# Patient Record
Sex: FEMALE | URBAN - NONMETROPOLITAN AREA
[De-identification: names, ages, dates, MRNs, and addresses within clinical notes are randomized per-mention and may not be internally consistent; named-entity substitution may affect disease eponyms.]

---

## 2018-06-15 PROBLEM — 40739000 DYSPHAGIA: Status: ACTIVE | Noted: 2018-04-10

## 2018-06-15 PROBLEM — 31297008 SOMATOFORM DISORDER: Status: ACTIVE | Noted: 2018-04-10

## 2018-06-15 PROBLEM — 440630006 IRRITABLE BOWEL SYNDROME CHARACTERIZED BY CONSTIPATION: Status: ACTIVE | Noted: 2018-06-15

## 2018-06-15 PROBLEM — 302914006 BARRETT'S ESOPHAGUS: Status: ACTIVE | Noted: 2018-04-10

## 2023-08-21 ENCOUNTER — CLAIMS CREATED FROM THE CLAIM WINDOW (OUTPATIENT)
Dept: URBAN - NONMETROPOLITAN AREA CLINIC 4 | Facility: CLINIC | Age: 68
End: 2023-08-21
Payer: COMMERCIAL

## 2023-08-21 ENCOUNTER — DASHBOARD ENCOUNTERS (OUTPATIENT)
Age: 68
End: 2023-08-21

## 2023-08-21 VITALS
HEIGHT: 63 IN | TEMPERATURE: 97.2 F | HEART RATE: 91 BPM | SYSTOLIC BLOOD PRESSURE: 111 MMHG | WEIGHT: 181 LBS | DIASTOLIC BLOOD PRESSURE: 62 MMHG | BODY MASS INDEX: 32.07 KG/M2

## 2023-08-21 DIAGNOSIS — R11.2 NAUSEA AND VOMITING, INTRACTABILITY OF VOMITING NOT SPECIFIED, UNSPECIFIED VOMITING TYPE: ICD-10-CM

## 2023-08-21 DIAGNOSIS — R11.2 ACUTE NAUSEA WITH NONBILIOUS VOMITING: ICD-10-CM

## 2023-08-21 DIAGNOSIS — R13.10 DYSPHAGIA, UNSPECIFIED: ICD-10-CM

## 2023-08-21 DIAGNOSIS — R12 HEARTBURN: ICD-10-CM

## 2023-08-21 DIAGNOSIS — K22.70 BARRETT'S ESOPHAGUS WITHOUT DYSPLASIA: ICD-10-CM

## 2023-08-21 DIAGNOSIS — R13.10 DYSPHAGIA: ICD-10-CM

## 2023-08-21 PROCEDURE — 99205 OFFICE O/P NEW HI 60 MIN: CPT | Performed by: PHYSICIAN ASSISTANT

## 2023-08-21 PROCEDURE — 99245 OFF/OP CONSLTJ NEW/EST HI 55: CPT | Performed by: PHYSICIAN ASSISTANT

## 2023-08-21 RX ORDER — ALBUTEROL SULFATE 90 UG/1
2 PUFFS AS NEEDED AEROSOL, METERED RESPIRATORY (INHALATION)
Status: ACTIVE | COMMUNITY

## 2023-08-21 RX ORDER — LEVOTHYROXINE SODIUM 112 UG/1
1 TABLET TABLET ORAL ONCE A DAY
Status: ACTIVE | COMMUNITY

## 2023-08-21 RX ORDER — PANTOPRAZOLE SODIUM 40 MG/1
1 TABLET TABLET, DELAYED RELEASE ORAL ONCE A DAY
Qty: 30 | OUTPATIENT
Start: 2023-08-21

## 2023-08-21 RX ORDER — CITALOPRAM HYDROBROMIDE 20 MG/1
1 TABLET TABLET ORAL ONCE A DAY
Qty: 30 | Status: ACTIVE | COMMUNITY

## 2023-08-21 RX ORDER — ONDANSETRON 4 MG/1
1 TABLET TABLET ORAL
Qty: 30 | Refills: 2 | OUTPATIENT

## 2023-08-21 RX ORDER — ONDANSETRON 4 MG/1
1 TABLET TABLET ORAL
Qty: 30 | Refills: 2 | Status: ON HOLD | COMMUNITY
Start: 2019-10-01

## 2023-08-21 RX ORDER — RABEPRAZOLE SODIUM 20 MG/1
1 TABLET TABLET, DELAYED RELEASE ORAL TWICE A DAY
Qty: 60 TABLET | Refills: 3 | Status: ACTIVE | COMMUNITY
Start: 2018-04-10

## 2023-08-21 RX ORDER — ZOLPIDEM TARTRATE 10 MG/1
TABLET, FILM COATED ORAL ONCE A DAY
Status: ACTIVE | COMMUNITY

## 2023-08-21 RX ORDER — OXYCODONE 15 MG/1
1 TABLET AS NEEDED TABLET ORAL
Status: ACTIVE | COMMUNITY

## 2023-08-21 RX ORDER — CALCITRIOL 0.25 UG/1
1 CAPSULE CAPSULE ORAL ONCE A DAY
Status: ACTIVE | COMMUNITY

## 2023-08-21 NOTE — HPI-MIGRATED HPI
;   ;     Nausea/Vomiting : Patient presents today for a 1 month followup visit. Since last visit, patient has continued Rabeprazole Sodium with/without relief of symptoms. Continue Famotidine at bedtime.  She will take Ondansetron HCl Tablet, 4 MG prn for nausea, with/without relief of symptoms.    3) If spasm occurs, try warm beverage. 4) Benefiber 1 tablespoonful daily. 5) Smoking cessation encouraged. Repeat EGD in 3 years (2021). 8) Zofran PRN nausea. 9) May benefit from seeing a neurologist.     On last visit 10/01/2019, Patient does states that she is nauseated all of the time for the past 4-5 months that has gotten progressively worse. She states that the nausea is constant but will fluctuate in severity. She does admit symptoms of vomiting which will happen almost every day.  She does experience occasional heartburn. She was taking Rabeprazole in the past but stopped taking it because she ran out.   Eating will trigger the nausea. Patient saw Dr. 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 Garcia. Neurology consultation was recommended but patient never followed up with a neurologist.  She had an EGD in 2018 that showed short segment Sharpe's esophagus and she was advised to repeat EGD in 3 years (2021).   Of note, she did complete the gastric emptying scan in 2018 and the results were that she actually empties a bit rapidly.  Patient continues to smoke 1 pack of cigarettes per day.;   Decreased appetite : On last visit 10/01/2019 Patient states that she has a low appetite and she is not able to eat. She will have to force herself to eat. She states that she has lost 10 lbs within the last 1-2 months. ;   8.21.23 extensive chart review, dating back to 2018/2019 where it was thought that her nausea was potentially secondary to post stroke status  I reviewed her endoscopic evaluation with the daughter and Dr. Garcia at [facility], of which the daughter was unaware of those findings  due to her other comorbidities she has not undergone repeat endoscopic evaluation, and as of late has been having significant morning nausea  on significant chart review she's also been hospitalized in April of 2023 for medication resistant urinary tract infection, as well as stool impaction x 2  on review of her medication list she is taking miralax daily but the patient refuses it. She also takes oxycodone four times daily, as well as fiber, PO iron, and does have omeprazole listed as a daily medication

## 2023-09-13 ENCOUNTER — OFFICE VISIT (OUTPATIENT)
Dept: URBAN - NONMETROPOLITAN AREA CLINIC 13 | Facility: CLINIC | Age: 68
End: 2023-09-13

## 2023-09-29 ENCOUNTER — TELEPHONE ENCOUNTER (OUTPATIENT)
Dept: URBAN - NONMETROPOLITAN AREA CLINIC 4 | Facility: CLINIC | Age: 68
End: 2023-09-29

## 2023-10-04 ENCOUNTER — TELEPHONE ENCOUNTER (OUTPATIENT)
Dept: URBAN - NONMETROPOLITAN AREA CLINIC 4 | Facility: CLINIC | Age: 68
End: 2023-10-04

## 2023-10-12 ENCOUNTER — TELEPHONE ENCOUNTER (OUTPATIENT)
Dept: URBAN - NONMETROPOLITAN AREA CLINIC 4 | Facility: CLINIC | Age: 68
End: 2023-10-12